# Patient Record
Sex: MALE | Race: BLACK OR AFRICAN AMERICAN | NOT HISPANIC OR LATINO | URBAN - NONMETROPOLITAN AREA
[De-identification: names, ages, dates, MRNs, and addresses within clinical notes are randomized per-mention and may not be internally consistent; named-entity substitution may affect disease eponyms.]

---

## 2019-08-26 NOTE — PATIENT DISCUSSION
New Prescription: Lotemax SM (loteprednol etabonate): drops,gel: 0.38% 1 drop twice a day as directed into both eyes 08-

## 2019-08-26 NOTE — PATIENT DISCUSSION
DRY EYE WITH INFLAMMATION:  PRESERVATIVE FREE ARTIFICIAL TEARS Q2 HOURS, LID HYGIENE. PRESCRIBED LOTEMAX SM BRIDGE. CONSIDER PUNCTAL PLUGS AND/OR LIPIFLOW AT FOLLOW. RETURN SOONER IF SYMPTOMS WORSEN.

## 2020-08-31 NOTE — PATIENT DISCUSSION
DRY EYE WITH INFLAMMATION: PRESERVATIVE FREE ARTIFICIAL TEARS Q2 HOURS, LID HYGIENE. Mary Kay Alfonso. CONSIDER PUNCTAL PLUGS AND/OR LIPIFLOW AT FOLLOW. RETURN SOONER IF SYMPTOMS WORSEN.

## 2020-08-31 NOTE — PATIENT DISCUSSION
New Prescription: Dorinda Diaz (lifitegrast): dropperette: 5% 1 drop twice a day as directed into both eyes 08-

## 2021-06-07 ENCOUNTER — IMPORTED ENCOUNTER (OUTPATIENT)
Dept: URBAN - NONMETROPOLITAN AREA CLINIC 1 | Facility: CLINIC | Age: 81
End: 2021-06-07

## 2021-06-07 PROBLEM — H16.223: Noted: 2021-06-07

## 2021-06-07 PROBLEM — H35.3132: Noted: 2021-06-07

## 2021-06-07 PROBLEM — H18.513: Noted: 2021-06-07

## 2021-06-07 PROBLEM — H35.3122: Noted: 2021-06-07

## 2021-06-07 PROBLEM — H26.493: Noted: 2021-06-07

## 2021-06-07 PROBLEM — Z96.1: Noted: 2021-06-07

## 2021-06-07 PROCEDURE — 92004 COMPRE OPH EXAM NEW PT 1/>: CPT

## 2021-06-07 PROCEDURE — 92015 DETERMINE REFRACTIVE STATE: CPT

## 2021-06-07 NOTE — PATIENT DISCUSSION
Pseudophakia OU with PCO OS - Discussed diagnosis in detail with patient- Cataract surgery done by Dr. Loi Diane noted today recommend YAG PC OS eval with Dr. Humberto Goncalves. Patient agrees with plan  - Continue to monitorDES / Guttata OU - Discussed diagnosis in detail with patient- Discussed signs and symptoms of progression- Recommend patient drinking plenty of water and starting Omega 3’s - Recommend Refresh or Systane  throughout the day- Continue to monitorARMD  OS- Discussed diagnosis in detail with patient- Recommend  patient start eye vitamins daily such as Preservision. Sample given 6/7/21- Recommend that patient start following the [address] patient to call or come into the office ASAP if any changes noted from today.  Grid given today by Dr. Americo Zheng with instructions on how to use 6/7/21- Will do testing once patient sees Dr. Humberto Goncalves - Continue to monitorAstigmatism / Felicia Severs / Silvina Pin - Discussed diagnosis in detail with patient- HOLD glasses RX at this time - Continue to monitor

## 2021-08-31 NOTE — PATIENT DISCUSSION
DRY EYE WITH INFLAMMATION: PRESERVATIVE FREE ARTIFICIAL TEARS Q2 HOURS, LID HYGIENE. Ally Gupta. CONSIDER PUNCTAL PLUGS AND/OR LIPIFLOW AT FOLLOW. RETURN SOONER IF SYMPTOMS WORSEN.

## 2022-04-09 ASSESSMENT — TONOMETRY
OD_IOP_MMHG: 12
OS_IOP_MMHG: 12

## 2022-04-09 ASSESSMENT — VISUAL ACUITY
OD_SC: 20/25-
OS_SC: 20/25-

## 2023-01-18 ENCOUNTER — ESTABLISHED PATIENT (OUTPATIENT)
Dept: URBAN - NONMETROPOLITAN AREA CLINIC 1 | Facility: CLINIC | Age: 83
End: 2023-01-18

## 2023-01-18 DIAGNOSIS — H26.493: ICD-10-CM

## 2023-01-18 DIAGNOSIS — H16.223: ICD-10-CM

## 2023-01-18 DIAGNOSIS — H43.811: ICD-10-CM

## 2023-01-18 DIAGNOSIS — H35.3122: ICD-10-CM

## 2023-01-18 DIAGNOSIS — H52.4: ICD-10-CM

## 2023-01-18 DIAGNOSIS — H18.513: ICD-10-CM

## 2023-01-18 PROCEDURE — 92015 DETERMINE REFRACTIVE STATE: CPT

## 2023-01-18 PROCEDURE — 99214 OFFICE O/P EST MOD 30 MIN: CPT

## 2023-01-18 RX ORDER — KETOROLAC TROMETHAMINE 5 MG/ML: 1 SOLUTION OPHTHALMIC

## 2023-01-18 ASSESSMENT — TONOMETRY
OS_IOP_MMHG: 10
OD_IOP_MMHG: 10

## 2023-01-18 ASSESSMENT — VISUAL ACUITY
OD_SC: 20/40
OS_SC: 20/40

## 2023-01-18 NOTE — PATIENT DISCUSSION
Discussed diagnosis in detail with patient. Recommend patient start eye vitamins daily such as Preservision. Sample given 6/7/21. Recommend that patient continue following the [address] patient to call or come into the office ASAP if any changes noted from today. Grid given by Dr. Allison Clark with instructions on how to use 6/7/21. Another grid given by Dr. Allison Clark 1/18/23. Continue to monitor.

## 2023-11-14 NOTE — PATIENT DISCUSSION
Cataract Counseling: Not visually significant to proceed with surgery at this time. I have discussed continuing with current spectacles vs updating. I have offerred the patient a new spectacle prescription to fill if desires. Return to follow up as schedled or sooner if symptoms arise. This document is complete and the patient is ready for discharge.